# Patient Record
Sex: MALE | Race: BLACK OR AFRICAN AMERICAN | NOT HISPANIC OR LATINO | Employment: STUDENT | URBAN - METROPOLITAN AREA
[De-identification: names, ages, dates, MRNs, and addresses within clinical notes are randomized per-mention and may not be internally consistent; named-entity substitution may affect disease eponyms.]

---
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
## 2022-02-21 ENCOUNTER — OFFICE VISIT (OUTPATIENT)
Dept: URGENT CARE | Facility: URGENT CARE | Age: 22
End: 2022-02-21

## 2022-02-21 VITALS
HEART RATE: 103 BPM | SYSTOLIC BLOOD PRESSURE: 98 MMHG | TEMPERATURE: 99.3 F | DIASTOLIC BLOOD PRESSURE: 60 MMHG | RESPIRATION RATE: 16 BRPM | OXYGEN SATURATION: 100 %

## 2022-02-21 DIAGNOSIS — A04.8 H. PYLORI INFECTION: ICD-10-CM

## 2022-02-21 DIAGNOSIS — R10.13 ABDOMINAL PAIN, EPIGASTRIC: Primary | ICD-10-CM

## 2022-02-21 LAB
ALBUMIN SERPL-MCNC: 4.4 G/DL (ref 3.4–5)
ALP SERPL-CCNC: 65 U/L (ref 40–150)
ALT SERPL W P-5'-P-CCNC: 19 U/L (ref 0–70)
AMYLASE SERPL-CCNC: 87 U/L (ref 30–110)
ANION GAP SERPL CALCULATED.3IONS-SCNC: 5 MMOL/L (ref 3–14)
AST SERPL W P-5'-P-CCNC: 9 U/L (ref 0–45)
BASOPHILS # BLD AUTO: 0 10E3/UL (ref 0–0.2)
BASOPHILS NFR BLD AUTO: 0 %
BILIRUB SERPL-MCNC: 0.9 MG/DL (ref 0.2–1.3)
BUN SERPL-MCNC: 14 MG/DL (ref 7–30)
CALCIUM SERPL-MCNC: 9.3 MG/DL (ref 8.5–10.1)
CHLORIDE BLD-SCNC: 104 MMOL/L (ref 94–109)
CO2 SERPL-SCNC: 28 MMOL/L (ref 20–32)
CREAT SERPL-MCNC: 0.88 MG/DL (ref 0.66–1.25)
EOSINOPHIL # BLD AUTO: 0 10E3/UL (ref 0–0.7)
EOSINOPHIL NFR BLD AUTO: 0 %
ERYTHROCYTE [DISTWIDTH] IN BLOOD BY AUTOMATED COUNT: 11.9 % (ref 10–15)
GFR SERPL CREATININE-BSD FRML MDRD: >90 ML/MIN/1.73M2
GLUCOSE BLD-MCNC: 117 MG/DL (ref 70–99)
HCT VFR BLD AUTO: 48 % (ref 40–53)
HGB BLD-MCNC: 15.5 G/DL (ref 13.3–17.7)
LIPASE SERPL-CCNC: 67 U/L (ref 73–393)
LYMPHOCYTES # BLD AUTO: 0.4 10E3/UL (ref 0.8–5.3)
LYMPHOCYTES NFR BLD AUTO: 8 %
MCH RBC QN AUTO: 29.8 PG (ref 26.5–33)
MCHC RBC AUTO-ENTMCNC: 32.3 G/DL (ref 31.5–36.5)
MCV RBC AUTO: 92 FL (ref 78–100)
MONOCYTES # BLD AUTO: 0.2 10E3/UL (ref 0–1.3)
MONOCYTES NFR BLD AUTO: 5 %
NEUTROPHILS # BLD AUTO: 4.1 10E3/UL (ref 1.6–8.3)
NEUTROPHILS NFR BLD AUTO: 87 %
PLATELET # BLD AUTO: 196 10E3/UL (ref 150–450)
POTASSIUM BLD-SCNC: 4.1 MMOL/L (ref 3.4–5.3)
PROT SERPL-MCNC: 8.4 G/DL (ref 6.8–8.8)
RBC # BLD AUTO: 5.21 10E6/UL (ref 4.4–5.9)
SODIUM SERPL-SCNC: 137 MMOL/L (ref 133–144)
WBC # BLD AUTO: 4.8 10E3/UL (ref 4–11)

## 2022-02-21 PROCEDURE — 83690 ASSAY OF LIPASE: CPT | Performed by: FAMILY MEDICINE

## 2022-02-21 PROCEDURE — 36415 COLL VENOUS BLD VENIPUNCTURE: CPT | Performed by: FAMILY MEDICINE

## 2022-02-21 PROCEDURE — 99204 OFFICE O/P NEW MOD 45 MIN: CPT | Performed by: FAMILY MEDICINE

## 2022-02-21 PROCEDURE — 85025 COMPLETE CBC W/AUTO DIFF WBC: CPT | Performed by: FAMILY MEDICINE

## 2022-02-21 PROCEDURE — 82150 ASSAY OF AMYLASE: CPT | Performed by: FAMILY MEDICINE

## 2022-02-21 PROCEDURE — 80053 COMPREHEN METABOLIC PANEL: CPT | Performed by: FAMILY MEDICINE

## 2022-02-21 PROCEDURE — 87338 HPYLORI STOOL AG IA: CPT | Performed by: FAMILY MEDICINE

## 2022-02-21 NOTE — PROGRESS NOTES
"SUBJECTIVE  HPI: Elle Graham is a 21 year old male  who presents with the CC of abdominal/pelvic pain.   Pain is located in the epigastric area, with radiation to None    The pain is characterized as \"pain\".    Pain has been present for 1 month(s) and is fluctuating.     EXACERBATING FACTORS: NEGATIVE.   RELIEVING FACTORS: NEGATIVE.    ASSOCIATED SX: none.     Past Medical History:   Diagnosis Date     Constipation      Allergies   Allergen Reactions     Peanuts [Nuts] Swelling     Social History     Tobacco Use     Smoking status: Never Smoker     Smokeless tobacco: Never Used   Substance Use Topics     Alcohol use: Not on file       ROS:CONSTITUTIONAL:NEGATIVE for fever, chills, change in weight    EXAMINATION:  BP 98/60   Pulse 103   Temp 99.3  F (37.4  C) (Tympanic)   Resp 16   SpO2 100% GENERAL APPEARANCE: healthy, alert and no distress  ABDOMEN: soft, normal bowel sounds, tenderness moderate epigastric      ICD-10-CM    1. Abdominal pain, epigastric  R10.13 Comprehensive metabolic panel     Amylase     Lipase     CBC with Platelets & Differential     Helicobacter pylori Antigen Stool     Helicobacter pylori Antigen Stool     omeprazole (PRILOSEC) 20 MG DR capsule       F/U PCP/IM/FP, ED if worse        Addendum-positive h pylori antigen   Patient positive for H pylori antigen -see lab result note-   increase omeprazole to bid dosing -prescription updated  Start clarithromycin 500 gm bid for 14 days and amoxicillin 1000 mg bid  for 14 days  Patient will be called with plan and recommended to schedule an appointment a few days prior to completing treatment with his PCP for long term plan and possible GI referral.      Lea Ellis MD         "

## 2022-02-21 NOTE — PATIENT INSTRUCTIONS
Patient Education     Gastritis (Adult)    Gastritis is inflammation and irritation of the stomach lining. You can have it for a short time (acute) or it can be long lasting (chronic). Infection with bacteria called H. pylori most often causes gastritis. More than 1 out of 3 people in the [place] have these bacteria in their bodies. In many cases, H. pylori causes no problems or symptoms. But in some people, the infection irritates the stomach lining and causes gastritis. H. pylori may be diagnosed through blood, stool, or breath tests, or by a biopsy during an endoscopy. Other causes of stomach irritation include drinking alcohol, smoking or chewing tobacco, or taking pain-relieving medicines called nonsteroidal anti-inflammatory drugs (NSAIDs), such as aspirin or ibuprofen. Some illegal drugs (such as cocaine) and immune conditions can also cause gastritis.   Symptoms of gastritis can include:    Belly pain or bloating    Feeling full quickly    Loss of appetite    Nausea or vomiting    Vomiting blood or having black stools    Feeling more tired than normal  An inflamed and irritated stomach lining is more likely to develop a sore called an ulcer. To help prevent this, gastritis should be evaluated and treated as soon as symptoms occur.   Home care  If needed, your healthcare provider may prescribe medicines. If you have H. pylori infection, treating it will likely ease your symptoms. Other changes can help reduce stomach irritation and help it heal.     Take prescription medicines as directed. If you have been prescribed medicines for H. pylori infection, take them as directed. Take all of the medicine until it's finished or until your provider tells you to stop taking it, even if you start to feel better.    Follow your healthcare provider's advice on NSAIDs. Your provider may advise you not to take NSAIDs. If you take daily aspirin for your heart or other health reasons, don't stop without talking with your  provider first.    Don't drink alcohol. If you need help stopping your use of alcohol, ask your provider for treatment resources.    Stop smoking. Smoking can irritate the stomach and delay healing. As much as possible, stay away from secondhand smoke. If you smoke and have trouble stopping, ask your provider for help.  Follow-up care  Follow up with your healthcare provider, or as advised. You may need testing to check for inflammation or an ulcer.   When to get medical advice  Call your healthcare provider for any of the following:    Stomach pain that gets worse or moves to the lower right belly (appendix area)    Chest pain that suddenly appears or gets worse, or spreads to the back, neck, shoulder, or arm    Frequent vomiting (can t keep down liquids)    Blood in the stool or vomit (red or black in color)    Feeling weak or dizzy    Shortness of breath    Unexplained weight loss    Fever of 100.4 F (38 C) or higher, or as directed by your healthcare provider    Symptoms that get worse, or new symptoms  Lolita last reviewed this educational content on 2/1/2021 2000-2021 [employer]. All rights reserved. This information is not intended as a substitute for professional medical care. Always follow your healthcare professional's instructions.

## 2022-02-22 LAB — H PYLORI AG STL QL IA: POSITIVE

## 2022-02-22 RX ORDER — CLARITHROMYCIN 500 MG
500 TABLET ORAL 2 TIMES DAILY
Qty: 28 TABLET | Refills: 0 | Status: SHIPPED | OUTPATIENT
Start: 2022-02-22 | End: 2022-03-08

## 2022-02-22 RX ORDER — AMOXICILLIN 500 MG/1
1000 CAPSULE ORAL 2 TIMES DAILY
Qty: 56 CAPSULE | Refills: 0 | Status: SHIPPED | OUTPATIENT
Start: 2022-02-22 | End: 2022-03-08

## 2022-02-23 NOTE — RESULT ENCOUNTER NOTE
Please call patient and inform him that his lab test for H pylori came back positive.     H pylori is a bacteria that many people have in their bodies.  Most of the time, it causes no problems. In some people, though, the H. pylori infection causes irritation of the stomach lining. This may make the lining more likely to be damaged by normal stomach acids and can cause an ulcer-an open sore in the lining of the stomach or the first part of the small intestine (called the duodenum)  H. pylori may also increase the amount of acid in the stomach. It is not clear why this infection leads to problems in some people and not in others    Based on your results, you need to start medications to treat the h pylori bacteria which should help to improve/hopefully resolve your stomach pain.     You need to start 2 antibiotics  Clarithromycin 500 mg 2 times a day for 14 days and Amoxicillin 1000 mg 2 times a day for 14 days -these medications were sent to the [facility]   You need to increase the omeprazole medication ordered in urgent care to 2 times a day for 14 days and continue 2 times a day until your appointment with your PCP - a new prescription with more of this medication was sent to the [facility] pharmacy     If your stomach pain is getting worse please contact your PCP  to be seen or return to urgent care.    In addition,   Please schedule an appointment  now to be seen by your PCP a few days before completing your medication for further evaluation and  long term plan and possible need for further evaluation by a gastroenterologist-that are specialists for the stomach/intestine.     Lea Ellis MD

## 2023-07-22 ENCOUNTER — HEALTH MAINTENANCE LETTER (OUTPATIENT)
Age: 23
End: 2023-07-22

## 2023-12-08 ENCOUNTER — HOSPITAL ENCOUNTER (EMERGENCY)
Facility: CLINIC | Age: 23
Discharge: HOME | End: 2023-12-08
Attending: EMERGENCY MEDICINE | Admitting: EMERGENCY MEDICINE

## 2023-12-08 ENCOUNTER — TELEPHONE (OUTPATIENT)
Dept: INTERNAL MEDICINE | Facility: CLINIC | Age: 23
End: 2023-12-08

## 2023-12-08 VITALS
DIASTOLIC BLOOD PRESSURE: 76 MMHG | OXYGEN SATURATION: 99 % | RESPIRATION RATE: 18 BRPM | HEART RATE: 74 BPM | SYSTOLIC BLOOD PRESSURE: 135 MMHG | TEMPERATURE: 98.3 F

## 2023-12-08 DIAGNOSIS — R45.89 DEPRESSED MOOD: ICD-10-CM

## 2023-12-08 DIAGNOSIS — F32.A DEPRESSION, UNSPECIFIED DEPRESSION TYPE: Primary | ICD-10-CM

## 2023-12-08 DIAGNOSIS — G47.10 SLEEPING EXCESSIVE: ICD-10-CM

## 2023-12-08 DIAGNOSIS — R68.82 LOW LIBIDO: ICD-10-CM

## 2023-12-08 PROBLEM — F41.1 GENERALIZED ANXIETY DISORDER: Status: ACTIVE | Noted: 2023-12-08

## 2023-12-08 PROBLEM — F32.9 MAJOR DEPRESSIVE DISORDER, SINGLE EPISODE, UNSPECIFIED: Status: ACTIVE | Noted: 2023-12-08

## 2023-12-08 PROCEDURE — 99283 EMERGENCY DEPT VISIT LOW MDM: CPT

## 2023-12-08 ASSESSMENT — ACTIVITIES OF DAILY LIVING (ADL): ADLS_ACUITY_SCORE: 35

## 2023-12-08 NOTE — ED TRIAGE NOTES
"Patient presents with complaints of \"low libido\" for a few months. States he would like to speak with a MD about this. Denies any PCP.  Patient denies any suicidal or homicidal thought. ABC intact without need for intervention at this time.           "

## 2023-12-08 NOTE — DISCHARGE INSTRUCTIONS
Appointments:    Psychiatry:  Date: Tuesday, 12/12/2023    Time: 1:30 pm - 2:30 am  Provider: Rosmery Villarreal  MSN  CNP,RN  Location: [facility], [address] [place] [address], [facility] [address], [place] [address]  Phone: [phone]  Type: Medication Mgmt - Initial (In-Person)  NEW PATIENT PAPERWORK WILL NEED TO BE COMPLETED & RETURNED 24HRS IN ADVANCE.     Therapy:  Date: Tuesday, 12/19/2023    Time: 2:00 pm - 3:00 pm  Provider: Alphonso Arango MA, LP  Location: [facility], [address], [address], [place] [address]  Phone: [phone]  Type: Therapy - Initial (In-Person)

## 2023-12-08 NOTE — CONSULTS
"Diagnostic Evaluation Consultation  Crisis Assessment    Patient Name: Elle Cardoza  Age:  23 year old  Legal Sex: male  Gender Identity: male  Pronouns:   Race: Black or   Ethnicity: Not  or   Language: [place]      Patient was assessed: In person      Patient location: [facility] EMERGENCY DEPT                             [id number]    Referral Data and Chief Complaint  Elle Cardoza presents to the ED by  self. Patient is presenting to the ED for the following concerns: Significant behavioral change, Depression.   Factors that make the mental health crisis life threatening or complex are:  Patient presents to the ED for evaluation of low eneregy and mood and increased anxiety and depression.  Patient said he has been having difficulties woth low motivation and libido.  Patient lives alone, he is employed at a  and [employer] and is going to school with hopes to complete a business degree.  Patient denies Si, he denies a history of suicide attempts.  Patient said he was previously prescribed Zoloft in high school, he thought that worked well and is interested in scheduling appointments for both psychiatry and therapy today.  Patient denies NSSI/HI, he denies AH/VH and no command hallucinations.  Per ED notes \"patient has a history of depression and anxiety who presents with depressed mood, lower libido, and excessive sleepiness.  Patient notes that he used to be on SSRI when he was in high school but discontinued this medication.  He does not currently have a primary care provider.  He denies any fever, cough, sore throat, chest pain, shortness of breath, abdominal pain, nausea vomiting or diarrhea, pain with urination, blood in his urine, suicidality or homicidal thoughts.  He denies any alcohol or drug abuse.\".      Informed Consent and Assessment Methods  Explained the crisis assessment process, including applicable information disclosures and limits to " confidentiality, assessed understanding of the process, and obtained consent to proceed with the assessment.  Assessment methods included conducting a formal interview with patient, review of medical records, collaboration with medical staff, and obtaining relevant collateral information from family and community providers when available.  : done     Patient response to interventions: acceptance expressed, verbalizes understanding  Coping skills were attempted to reduce the crisis:  Patient stated he spoke with supports, his mother and cousin who encouraged him to seek out services     History of the Crisis   Patient notes a history of anxiety and depression starting in high school.  Patient states this has slowly worsened over the last 3 months.  Patient denies SI, he denies having thoughts of a plan, intent, or means.  Patient previously was prescribed Zoloft in high school by his PCP, patient has not worked with outpatient psychiatry or therapy.  Patient has no history of previous programmatic care or inpatient admissions, and no prior commitment.  Patient denies alcohol and substance use.  Patient denies NSSI/HI, and denies hallucinations and symptoms of psychosis.    Brief Psychosocial History  Family:  Single, Children no  Support System:  Parent(s), Other (specify) (Cousin)  Employment Status:  employed part-time  Source of Income:  salary/wages  Financial Environmental Concerns:  insurance inadequate  Current Hobbies:  family functions, group/social activities, television/movies/videos  Barriers in Personal Life:  lack of motivation, mental health concerns    Significant Clinical History  Current Anxiety Symptoms:  anxious, excessive worry, panic attack  Current Depression/Trauma:  difficulty concentrating, sadness  Current Somatic Symptoms:  excessive worry, anxious  Current Psychosis/Thought Disturbance:  impulsive  Current Eating Symptoms:     Chemical Use History:  Alcohol: None  Benzodiazepines:  None  Opiates: None  Cocaine: None  Marijuana: None  Other Use: None   Past diagnosis:  Anxiety Disorder, Depression  Family history:  No known history of mental health or chemical health concerns  Past treatment:  School Counselor, Primary Care  Details of most recent treatment:     Other relevant history:          Collateral Information  Is there collateral information: No     Called patient's mother Marianen at  [phone], no answer, left a voicemail requesting a call back         Risk Assessment  Kimball Suicide Severity Rating Scale Full Clinical Version:  Suicidal Ideation  Q1 Wish to be Dead (Lifetime): Yes  Q2 Non-Specific Active Suicidal Thoughts (Lifetime): Yes  3. Active Suicidal Ideation with any Methods (Not Plan) Without Intent to Act (Lifetime): No  Q4 Active Suicidal Ideation with Some Intent to Act, Without Specific Plan (Lifetime): No  Q5 Active Suicidal Ideation with Specific Plan and Intent (Lifetime): No  Q6 Suicide Behavior (Lifetime): no     Suicidal Behavior (Lifetime)  Actual Attempt (Lifetime): No  Has subject engaged in non-suicidal self-injurious behavior? (Lifetime): No  Interrupted Attempts (Lifetime): No  Aborted or Self-Interrupted Attempt (Lifetime): No  Preparatory Acts or Behavior (Lifetime): No  Low risk             Environmental or Psychosocial Events: work or task failure, barriers to accessing healthcare, other life stressors  Protective Factors: Protective Factors: strong bond to family unit, community support, or employment, responsibilities and duties to others, including pets and children, lives in a responsibly safe and stable environment, sense of importance of health and wellness, help seeking    Does the patient have thoughts of harming others? Feels Like Hurting Others: no  Previous Attempt to Hurt Others: no  Is the patient engaging in sexually inappropriate behavior?: no    Is the patient engaging in sexually inappropriate behavior?  no        Mental Status Exam    Affect: Appropriate, anxious  Appearance: Appropriate  Attention Span/Concentration: Attentive  Eye Contact: Engaged    Fund of Knowledge: Appropriate   Language /Speech Content: Fluent  Language /Speech Volume: Normal  Language /Speech Rate/Productions: Normal  Recent Memory: Intact  Remote Memory: Intact  Mood: Anxious, Depressed  Orientation to Person: Yes   Orientation to Place: Yes  Orientation to Time of Day: Yes  Orientation to Date: Yes     Situation (Do they understand why they are here?): Yes  Psychomotor Behavior: Normal  Thought Content: Clear  Thought Form: Intact        Medication  Psychotropic medications:   Medication Orders - Psychiatric (From admission, onward)      None             Current Care Team  Patient Care Team:  No Ref-Primary, Physician as PCP - General    Diagnosis  Patient Active Problem List   Diagnosis Code    History of travel to [place] [id number]; born in [place] Z78.9    Myopia H52.10    Acne vulgaris L70.0    H/O unilateral orchiectomy Z90.79    History of torsion of testis Z87.438    Major depressive disorder, single episode, unspecified F32.9    Generalized anxiety disorder F41.1       Primary Problem This Admission  Active Hospital Problems    Major depressive disorder, single episode, unspecified      Generalized anxiety disorder        Clinical Summary and Substantiation of Recommendations   Patient presents to the ED for evaluation of low eneregy and mood and increased anxiety and depression. Patient said he has been having difficulties woth low motivation and libido. Patient lives alone, he is employed at a  and [employer] and is going to school with hopes to complete a business degree. Patient denies SI, he denies a history of suicide attempts. Patient said he was previously prescribed Zoloft in high school, he thought that worked well and is interested in scheduling appointments for both psychiatry and therapy today. Patient denies NSSI/HI, he denies AH/VH and no  command hallucinations.     Patient denies SI/HI/NSSI today.  Patient appropriately engaged in safety and discharge planning.  Upon assessment, the patient's acute suicide risk was determined to be low due to the following factors: reduction in the intensity of mood/anxiety symptoms that preceded the admission, denial of suicidal thoughts, denies feeling helpless or hopeless, not currently under the influence of alcohol or illicit substances, denies experiencing command hallucinations and no immediate access to firearms. Protective factors include: social support, voluntarily seeking mental health support, displays resiliency, future focused thinking, displays insight, expresses desire to engage in treatment, sense of obligation to people, and safe/stable housing.      Patient coping skills attempted to reduce the crisis:  Patient stated he spoke with supports, his mother and cousin who encouraged him to seek out services    Disposition  Recommended disposition: Individual Therapy, Medication Management        Reviewed case and recommendations with attending provider. Attending Name: HERB Storm       Attending concurs with disposition: yes       Patient and/or validated legal guardian concurs with disposition:   yes       Final disposition:  discharge    Legal status on admission: Voluntary/Patient has signed consent for treatment    Assessment Details   Total duration spent with the patient: 40 min     CPT code(s) utilized: 26860 - Psychotherapy for Crisis - 60 (30-74*) min    LEX Luna, Psychotherapist  [facility] - Triage & Transition Services  Callback: [phone]

## 2023-12-08 NOTE — ED PROVIDER NOTES
History   Chief Complaint:  Depressed mood     HPI   Elle Cardoza is a 23 year old male with a history of depression and anxiety who presents with depressed mood, lower libido, and excessive sleepiness.  Patient notes that he used to be on SSRI when he was in high school but discontinued this medication.  He does not currently have a primary care provider.  He denies any fever, cough, sore throat, chest pain, shortness of breath, abdominal pain, nausea vomiting or diarrhea, pain with urination, blood in his urine, suicidality or homicidal thoughts.  He denies any alcohol or drug abuse.  He is interested in speaking with someone about his mental health today.  He does note a history of anxiety as well and has been feeling anxious in recent weeks.  He was also requesting a testosterone test given his low libido.    Independent Historian:   None - Patient Only    Medications:    oxymetazoline (AFRIN NASAL SPRAY) 0.05 % nasal spray      Past Medical History:    Past Medical History:   Diagnosis Date    Constipation      Past Surgical History:    Past Surgical History:   Procedure Laterality Date    NO HISTORY OF SURGERY        Physical Exam   Patient Vitals for the past 24 hrs:   BP Temp Pulse Resp SpO2   12/08/23 1133 135/76 98.3  F (36.8  C) 74 18 99 %      General: Alert, appears well-developed and well-nourished. Cooperative.     In no acute distress  HEENT:  Head:  Atraumatic  Ears:  External ears are normal  Mouth/Throat:  Oropharynx is without erythema or exudate and mucous membranes are moist.   Eyes:   Conjunctivae normal and EOM are normal. No scleral icterus.  CV:  Normal rate, regular rhythm, normal heart sounds and radial pulses are 2+ and symmetric.  No murmur.  Resp:  Breath sounds are clear bilaterally    Non-labored, no retractions or accessory muscle use  GI:  Abdomen is soft, no distension, no tenderness. No rebound or guarding.  No CVA tenderness bilaterally  MS:  Normal range of motion. No  edema.    Normal strength in all 4 extremities.     Back atraumatic.    No midline cervical, thoracic, or lumbar tenderness  Skin:  Warm and dry.  No rash or lesions noted.  Neuro:   Alert. Normal strength.  GCS: 15  Psych: Depressed mood and flat affect.  Denies homicidal and suicidal ideation.  Denies hallucinations.      Emergency Department Course   No results found for this or any previous visit.  Imaging:  No orders to display      Report per radiology    Laboratory:  Labs Ordered and Resulted from Time of ED Arrival to Time of ED Departure - No data to display     Procedures     Emergency Department Course & Assessments:     Interventions:  Medications - No data to display     Independent Interpretation (X-rays, CTs, rhythm strip):  None    Consultations/Discussion of Management or Tests:        Social Determinants of Health affecting care:   Healthcare Access/Compliance and Education/Literacy    Disposition:  The patient was discharged to home.     Impression & Plan    CMS Diagnoses: None    Medical Decision Making:  Elle Cardoza is a 23 year old male who presents for evaluation of depressed mood.  There is a history of depression in the past and they are not on medications. There are no signs at this point of a general medical problem causing depression (brain tumor, metabolic derangement like hypothyroidism).      There are no signs of serious decompensation warranting psychiatric hospitalization or 72 hold (no suicidal or homicidal ideation, no danger to self).  Supportive outpatient management is therefore indicated.      I did have [facility] evaluate the patient, their recommendations are noted in separate note.   They as well agree with the above recommendations.      Diagnosis:    ICD-10-CM    1. Depression, unspecified depression type  F32.A Primary Care Referral      2. Low libido  R68.82 Primary Care Referral      3. Sleeping excessive  G47.10       4. Depressed mood  R45.89            Discharge  Medications:  New Prescriptions    No medications on file      12/8/2023   Geremias Storm MD White, Scott, MD  12/08/23 2191

## 2023-12-08 NOTE — TELEPHONE ENCOUNTER
Call received from patient requesting appointment with a primary care provider. Patient is requesting a physical. Patient is requesting [place]. Transferred to scheduling.

## 2024-09-08 ENCOUNTER — HEALTH MAINTENANCE LETTER (OUTPATIENT)
Age: 24
End: 2024-09-08